# Patient Record
(demographics unavailable — no encounter records)

---

## 2025-06-12 NOTE — DATA REVIEWED
[FreeTextEntry1] : Most recent bone mineral density Date: June 4, 2025 Source: [facility] Site: [facility]  Site BMD T-score Change previous Change baseline  Lumbar spine 0.797 -2.0  +1.3%  Femoral neck 0.646 -1.8  -0.2%  Total hip 0.920 -0.2  -1.4%  Distal radius 0.593 -1.6  +1.7%  DXA comments: *Significant change from previous; L4 excluded; left hip and radius values Vertebral fracture assessment without evidence of compression fractures  Impression: I have personally reviewed the DXA images and report, and I compared these images to a DXA dated June 4, 2024 from [facility]. There is osteopenia by the World Health Organization criteria. There were no significant changes from previous. Vertebral fracture assessment without evidence of compression fractures.

## 2025-06-12 NOTE — PHYSICAL EXAM
[Alert] : alert [Healthy Appearance] : healthy appearance [No Acute Distress] : no acute distress [Normal Sclera/Conjunctiva] : normal sclera/conjunctiva [Normal Hearing] : hearing was normal [No Respiratory Distress] : no respiratory distress [No Stigmata of Cushings Syndrome] : no stigmata of Cushings Syndrome [Normal Gait] : normal gait constant/dull [Normal Insight/Judgement] : insight and judgment were intact [Kyphosis] : no kyphosis present [Acanthosis Nigricans] : no acanthosis nigricans [de-identified] : no moon facies, no supraclavicular fat pads
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
